# Patient Record
Sex: MALE | Race: AMERICAN INDIAN OR ALASKA NATIVE
[De-identification: names, ages, dates, MRNs, and addresses within clinical notes are randomized per-mention and may not be internally consistent; named-entity substitution may affect disease eponyms.]

---

## 2018-08-11 ENCOUNTER — HOSPITAL ENCOUNTER (INPATIENT)
Dept: HOSPITAL 31 - C.ER | Age: 23
LOS: 4 days | Discharge: HOME | DRG: 895 | End: 2018-08-15
Attending: PSYCHIATRIC UNIT | Admitting: PSYCHIATRIC UNIT
Payer: COMMERCIAL

## 2018-08-11 VITALS — BODY MASS INDEX: 25.8 KG/M2

## 2018-08-11 DIAGNOSIS — D72.829: ICD-10-CM

## 2018-08-11 DIAGNOSIS — N39.0: ICD-10-CM

## 2018-08-11 DIAGNOSIS — F12.20: ICD-10-CM

## 2018-08-11 DIAGNOSIS — F11.23: Primary | ICD-10-CM

## 2018-08-11 DIAGNOSIS — F17.210: ICD-10-CM

## 2018-08-11 DIAGNOSIS — J45.909: ICD-10-CM

## 2018-08-11 LAB
ALBUMIN SERPL-MCNC: 4.3 G/DL (ref 3.5–5)
ALBUMIN/GLOB SERPL: 1.4 {RATIO} (ref 1–2.1)
ALT SERPL-CCNC: 21 U/L (ref 21–72)
AST SERPL-CCNC: 18 U/L (ref 17–59)
BASOPHILS # BLD AUTO: 0.1 K/UL (ref 0–0.2)
BASOPHILS NFR BLD: 0.5 % (ref 0–2)
BILIRUB UR-MCNC: NEGATIVE MG/DL
BUN SERPL-MCNC: 9 MG/DL (ref 9–20)
CALCIUM SERPL-MCNC: 9.7 MG/DL (ref 8.6–10.4)
EOSINOPHIL # BLD AUTO: 0.4 K/UL (ref 0–0.7)
EOSINOPHIL NFR BLD: 2.7 % (ref 0–4)
ERYTHROCYTE [DISTWIDTH] IN BLOOD BY AUTOMATED COUNT: 13.1 % (ref 11.5–14.5)
GFR NON-AFRICAN AMERICAN: > 60
GLUCOSE UR STRIP-MCNC: NORMAL MG/DL
HGB BLD-MCNC: 13.6 G/DL (ref 12–18)
LEUKOCYTE ESTERASE UR-ACNC: (no result) LEU/UL
LYMPHOCYTES # BLD AUTO: 1.8 K/UL (ref 1–4.3)
LYMPHOCYTES NFR BLD AUTO: 13.4 % (ref 20–40)
MCH RBC QN AUTO: 28.1 PG (ref 27–31)
MCHC RBC AUTO-ENTMCNC: 33.1 G/DL (ref 33–37)
MCV RBC AUTO: 84.8 FL (ref 80–94)
MONOCYTES # BLD: 1 K/UL (ref 0–0.8)
MONOCYTES NFR BLD: 7.4 % (ref 0–10)
NEUTROPHILS # BLD: 10.4 K/UL (ref 1.8–7)
NEUTROPHILS NFR BLD AUTO: 76 % (ref 50–75)
NRBC BLD AUTO-RTO: 0 % (ref 0–2)
PH UR STRIP: 7 [PH] (ref 5–8)
PLATELET # BLD: 234 K/UL (ref 130–400)
PMV BLD AUTO: 8.7 FL (ref 7.2–11.7)
PROT UR STRIP-MCNC: NEGATIVE MG/DL
RBC # BLD AUTO: 4.83 MIL/UL (ref 4.4–5.9)
RBC # UR STRIP: NEGATIVE /UL
SP GR UR STRIP: 1.02 (ref 1–1.03)
URINE AMORPHOUS SEDIMENT: (no result) /UL
UROBILINOGEN UR-MCNC: 2 MG/DL (ref 0.2–1)
WBC # BLD AUTO: 13.6 K/UL (ref 4.8–10.8)

## 2018-08-11 PROCEDURE — HZ2ZZZZ DETOXIFICATION SERVICES FOR SUBSTANCE ABUSE TREATMENT: ICD-10-PCS | Performed by: PSYCHIATRIC UNIT

## 2018-08-11 PROCEDURE — HZ59ZZZ INDIVIDUAL PSYCHOTHERAPY FOR SUBSTANCE ABUSE TREATMENT, SUPPORTIVE: ICD-10-PCS | Performed by: PSYCHIATRIC UNIT

## 2018-08-11 PROCEDURE — HZ56ZZZ INDIVIDUAL PSYCHOTHERAPY FOR SUBSTANCE ABUSE TREATMENT, PSYCHOEDUCATION: ICD-10-PCS | Performed by: PSYCHIATRIC UNIT

## 2018-08-11 RX ADMIN — BUPRENORPHINE HCL SCH MG: 2 TABLET SUBLINGUAL at 19:03

## 2018-08-11 RX ADMIN — BUPRENORPHINE HCL SCH MG: 2 TABLET SUBLINGUAL at 20:08

## 2018-08-11 RX ADMIN — BUPRENORPHINE HCL SCH MG: 2 TABLET SUBLINGUAL at 20:58

## 2018-08-11 NOTE — C.PDOC
History Of Present Illness


22 year old male patient presents to the ER requesting detox for heroin. 

Patient states he has been inhaling heroin for x1 year. His last dose was 

yesterday afternoon. Patient notes this is his first detox for heroin. Patient 

denies fever, chills, nausea, vomiting, abdominal pain, and diarrhea, suicidal 

or homicidal ideation. 


Time Seen by Provider: 08/11/18 14:03


Chief Complaint (Nursing): Substance Abuse


History Per: Patient


History/Exam Limitations: no limitations





Past Medical History


Reviewed: Historical Data, Nursing Documentation, Vital Signs


Vital Signs: 


 Last Vital Signs











Temp  98.6 F   08/11/18 16:51


 


Pulse  86   08/11/18 16:51


 


Resp  18   08/11/18 16:51


 


BP  128/78   08/11/18 16:51


 


Pulse Ox  100   08/11/18 18:03














- Medical History


PMH: Asthma


Family History: States: Unknown Family Hx





- Social History


Hx Alcohol Use: No


Hx Substance Use: Yes (heroin)





- Immunization History


Hx Tetanus Toxoid Vaccination: No


Hx Influenza Vaccination: Yes


Hx Pneumococcal Vaccination: No





Review Of Systems


Except As Marked, All Systems Reviewed And Found Negative.


Constitutional: Positive for: Other (requesting detox).  Negative for: Fever, 

Chills


Gastrointestinal: Negative for: Nausea, Vomiting, Abdominal Pain, Diarrhea





Physical Exam





- Physical Exam


Appears: Well, Non-toxic, No Acute Distress


Skin: Normal Color, Warm, Dry


Head: Atraumatic, Normacephalic


Eye(s): bilateral: Normal Inspection


Ear(s): Bilateral: Normal


Nose: Normal


Oral Mucosa: Moist


Throat: Normal


Neck: Normal ROM, Supple


Chest: Symmetrical, No Deformity


Cardiovascular: Rhythm Regular


Respiratory: Normal Breath Sounds, No Rales, No Rhonchi, No Wheezing


Gastrointestinal/Abdominal: Soft, No Tenderness


Back: No CVA Tenderness


Extremity: Normal ROM (x4)


Neurological/Psych: Oriented x3, Normal Speech, Normal Motor, Normal Sensation, 

Normal Reflexes


Gait: Steady





ED Course And Treatment





- Laboratory Results


Result Diagrams: 


 08/11/18 14:13





 08/11/18 14:13


Lab Interpretation: Abnormal (leukocytosis, trace of leuk est and elevated wbc 

of urine)


O2 Sat by Pulse Oximetry: 100 (RA)


Pulse Ox Interpretation: Normal





Medical Decision Making


Medical Decision Making: 


Impression:  requesting detox for heroin


Plans: 


-- blood work 


-- keflex


-- urine cx


-- UA


Reassess: Patient is resting comfortably. Tolerating PO. Patient will receive a 

detox bed for his detox for heroin. Pt was seen by Crisis worker and was 

accepted by Dr. Salter. 





RECOMMEND KEFLEX FOR URINARY TRACK INFECTION AND REPEAT CBC. PLEASE ORDER 

HOSPITALIST EVALUATION ON THE FLOOR. 





Disposition


Counseled Patient/Family Regarding: Studies Performed, Diagnosis





- Disposition


Disposition: HOSPITALIZED


Disposition Time: 16:25


Condition: STABLE





- Clinical Impression


Clinical Impression: 


 Opioid dependence, Urinary tract infection, Leukocytosis








- PA / NP / Resident Statement


MD/ has reviewed & agrees with the documentation as recorded.





- Scribe Statement


The provider has reviewed the documentation as recorded by the Ritchie Colón Do





All medical record entries made by the Scribe were at my direction and 

personally dictated by me. I have reviewed the chart and agree that the record 

accurately reflects my personal performance of the history, physical exam, 

medical decision making, and the department course for this patient. I have 

also personally directed, reviewed, and agree with the discharge instructions 

and disposition.





Decision To Admit





- Pt Status Changed To:


Hospital Disposition Of: Inpatient





- Admit Certification


Admit to Inpatient:: After my assessment, the patient will require 

hospitalization for at least two midnights.  This is because of the severity of 

symptoms shown, intensity of services needed, and/or the medical risk in this 

patient being treated as an outpatient.





- InPatient:


Physician Admission Certification: I certify that this patient requires 2 or 

more midnights of care for the following reason:: Pt admitted for opiod detox. 

will treat uti with keflex and repeat cbc.





- .


Bed Request Type: Detox


Admitting Physician: Rohan Salter


Patient Diagnosis: 


 Opioid dependence, Urinary tract infection, Leukocytosis

## 2018-08-11 NOTE — PCM.BM
<Gilberto Geiger - Last Filed: 08/11/18 16:44>





Treatment Plan Problems





- Problems identified on initial assessmt


  ** potential for opiate withdrawal


Date Initiated: 08/11/18


Time Initiated: 16:45


Status: Active





Treatment assets and liabiliti


Patient Assests: cognitively intact


Patient Liabilities: substance abuse





- Milieu Protocol


Maintain good personal hygiene: daily Encourage regular showers, daily Remind 

patient to perform daily oral care, daily Assist patient to perform ADL's


Maintain personal safety: every shift Educate patient to report safety concerns 

to staff, every shift Monitor environment for contraband/sharps


Medication safety: Monitor for expected outcome, potential side effects: every 

shift, Assess barriers to learning: every shift, Assess readiness for 

medication education: every shift





<Aydee Garcia - Last Filed: 08/15/18 08:58>





- Diagnosis


(1) Opioid dependence


Status: Acute   


Interventions: 





08/13/18 18:58


* Assess 7x/week regarding severity of withdrawal


* Educate regarding risks, benefits, side effects and alternatives of 

medications


* Use Motivational Interviewing for abstinence


* Use CBT for relapse prevention


* Medication management for withdrawal symptoms


* Encourage medication assisted treatment


*

## 2018-08-12 RX ADMIN — HYDROCORTISONE SCH: 25 CREAM TOPICAL at 19:41

## 2018-08-12 RX ADMIN — HYDROCORTISONE SCH APPLIC: 25 CREAM TOPICAL at 18:28

## 2018-08-12 NOTE — PCM.PSYCH
Initial Psychiatric Evaluation





- Initial Psychiatric Evaluation


Type of Admission: Voluntary


Legal Status: Capacity


Chief Complaint (in patient's own words): 





I need help from his substance use.


I want to stop using drugs.


History of Present Illness and Precipitating Events: 





Patient is a 22 years old, single, employed,  male with no previous 

psychiatric history was admitted due to withdrawing from heroin and cannabis.





Heroin: Patient started using heroin one year ago. Prior to that he was taking 

Percocet. Initially he was prescribed Percocet 30 mg daily. He increased taking 

Percocet. He was taking way more than prescribed for about 2 years and after 

that he switched to heroin. Currently he was using almost 1 bundle daily. He 

was dividing 1 bundle and was taking 3 times daily, snorting or IV. Last used 

reported 2 days ago. Denied any previous detox or rehabs.





Cannabis: He started using cannabis at 17 years of age, was using once a month. 

His last used was 5 days ago. HI and he was using 1 g.





He smokes half pack of cigarettes daily and is requesting for nicotine patch.





Patient was born in [place] and has high school graduation. He is working. 

He is single and has 111 months old daughter would lives with her mother. 

Patient lives with his father. His height is 5 feet 10 inches and weight is 180 

pounds.





Patient does not want to go to any program for follow-up care after discharge 

from the hospital.


Current Medications: 





Active Medications











Generic Name Dose Route Start Last Admin





  Trade Name Freq  PRN Reason Stop Dose Admin


 


Al Hydrox/Mg Hydrox/Simethicone  30 ml  08/11/18 17:35  





  Maalox 30 Ml  PO   





  TID PRN   





  Indigestion / Heartburn   


 


Buprenorphine HCl  4 mg  08/13/18 10:00  





  Subutex  SL  08/16/18 09:59  





  .TAPER LEONARD   





  Taper   


 


Clonidine HCl  0.1 mg  08/11/18 17:32  08/11/18 21:18





  Catapres  PO   0.1 mg





  Q8 PRN   Administration





  COWS Score More or Equal to 5   


 


Dicyclomine HCl  10 mg  08/11/18 17:36  08/11/18 21:17





  Bentyl  PO   10 mg





  Q6 PRN   Administration





  Abdominal Cramps   


 


Hydrocortisone  0 gm  08/12/18 18:00  





  Anusol-Hc  AR   





  BID LEONARD   


 


Ibuprofen  400 mg  08/11/18 17:36  08/12/18 11:55





  Motrin Tab  PO   400 mg





  Q6 PRN   Administration





  Pain, moderate (4-7)   


 


Loperamide HCl  2 mg  08/11/18 17:35  





  Imodium  PO   





  Q8 PRN   





  Diarrhea   


 


Nicotine  1 patch  08/12/18 13:15  08/12/18 13:26





  Nicoderm Cq  TD   1 patch





  DAILY LEONARD   Administration


 


Ondansetron HCl  4 mg  08/11/18 17:35  08/11/18 21:18





  Zofran Tab  PO   4 mg





  Q8 PRN   Administration





  Nausea/Vomiting   


 


Trazodone HCl  50 mg  08/11/18 22:00  08/11/18 21:17





  Desyrel  PO   50 mg





  HS LEONARD   Administration














Past Psychiatric History





- Past Psychiatric History


Previous Treatment History: None


History of Abuse: 





None reported


History of ETOH/Drug Use: 





See HPI


History of Family Illness: 





None reported


Pertinent Medical Hx (Current Medical&Sleep Prob, Allergies): 





 Allergies











Allergy/AdvReac Type Severity Reaction Status Date / Time


 


No Known Allergies Allergy   Verified 08/11/18 13:56








 





RX: No Known Home Med  08/11/18 





Asthma


UTI





Review of Systems





- Psychiatric


Psychiatric: As Per HPI





Mental Status Examination





- Personal Presentation


Personal Presentation: Looks stated age





- Affect


Affect: Other (Appropriate)





- Motor Activity


Motor Activity: Calm





- Reliability in Providing Information


Reliability in Providing Information: Fair





- Speech


Speech: Organized





- Mood


Mood: Anxious





- Formal Thought Process


Formal Thought Process: No Impairment





- Hallucinations/Delusions


Hallucinations: Other (None reported)


Delusions: Other





- Obsessions/Compulsions


Obsessions: None


Compulsions: None





- Cognitive Functions


Orientation: Person, Place, Situation, Time


Sensorium: Alert


Attention/Concentration: Attentive


Abstract Thinking: [place]


Estimate of Intelligence: Average


Judgement: Intact, as evidence by: Insight regarding need for hospitalization


Memory: Recent intact, as evidence by: Ability to recall events of the day, 

Remote intact, as evidenced by: Ability to recall historical events





- Risk


Risk: Withdrawal, Diminished functioning





- Strength & Assets Inventory


Strength & Assets Inventory: Family support, Cooperative





- Limitations


Limitations: Other





DSM 5 DX





- DSM 5


DSM 5 Diagnosis: 





Opiate use disorder severe


Cannabis use disorder severe





- Recommended/Plan of Treatment


Treatment Recommendations and Plan of Treatment: 





Patient education.


Supportive therapy.


CBT for relapse prevention.


MI for abstinence.


Subutex taper for opiate withdrawal symptoms.


Other when necessary medication.


Projected ELOS: 4-5 days





- Smoking Cessation


Smoking Cessation Initiated: Yes

## 2018-08-12 NOTE — CP.PCM.CON
<Wilberto Price P - Last Filed: 08/12/18 04:29>





Meds


Allergies/Adverse Reactions: 


 Allergies











Allergy/AdvReac Type Severity Reaction Status Date / Time


 


No Known Allergies Allergy   Verified 08/11/18 13:56














- Medications


Medications: 


 Current Medications





Al Hydrox/Mg Hydrox/Simethicone (Maalox 30 Ml)  30 ml PO TID PRN


   PRN Reason: Indigestion / Heartburn


Buprenorphine HCl (Subutex)  6 mg SL .TAPER LEONARD


   PRN Reason: Taper


   Stop: 08/15/18 17:59


Clonidine HCl (Catapres)  0.1 mg PO Q8 PRN


   PRN Reason: COWS Score More or Equal to 5


   Last Admin: 08/11/18 21:18 Dose:  0.1 mg


Dicyclomine HCl (Bentyl)  10 mg PO Q6 PRN


   PRN Reason: Abdominal Cramps


   Last Admin: 08/11/18 21:17 Dose:  10 mg


Ibuprofen (Motrin Tab)  400 mg PO Q6 PRN


   PRN Reason: Pain, moderate (4-7)


   Last Admin: 08/11/18 21:18 Dose:  400 mg


Loperamide HCl (Imodium)  2 mg PO Q8 PRN


   PRN Reason: Diarrhea


Ondansetron HCl (Zofran Tab)  4 mg PO Q8 PRN


   PRN Reason: Nausea/Vomiting


   Last Admin: 08/11/18 21:18 Dose:  4 mg


Trazodone HCl (Desyrel)  50 mg PO HS [facility]


   Last Admin: 08/11/18 21:17 Dose:  50 mg











Results





- Vital Signs


Recent Vital Signs: 


 Last Vital Signs











Temp  98.8 F   08/11/18 21:05


 


Pulse  78   08/11/18 21:05


 


Resp  18   08/11/18 21:05


 


BP  120/67   08/11/18 21:05


 


Pulse Ox  99   08/11/18 21:05














- Labs


Result Diagrams: 


 08/11/18 14:13





 08/11/18 14:13


Labs: 


 Laboratory Results - last 24 hr











  08/11/18 08/11/18 08/11/18





  14:13 14:13 14:13


 


WBC  13.6 H  


 


RBC  4.83  


 


Hgb  13.6  D  


 


Hct  40.9  


 


MCV  84.8  D  


 


MCH  28.1  


 


MCHC  33.1  


 


RDW  13.1  


 


Plt Count  234  


 


MPV  8.7  


 


Neut % (Auto)  76.0 H  


 


Lymph % (Auto)  13.4 L  


 


Mono % (Auto)  7.4  


 


Eos % (Auto)  2.7  


 


Baso % (Auto)  0.5  


 


Neut # (Auto)  10.4 H  


 


Lymph # (Auto)  1.8  


 


Mono # (Auto)  1.0 H  


 


Eos # (Auto)  0.4  


 


Baso # (Auto)  0.1  


 


Sodium    141


 


Potassium    3.8


 


Chloride    99


 


Carbon Dioxide    31 H


 


Anion Gap    15


 


BUN    9


 


Creatinine    0.8


 


Est GFR ( Amer)    > 60


 


Est GFR (Non-Af Amer)    > 60


 


Random Glucose    133 H


 


Calcium    9.7


 


Total Bilirubin    0.5


 


AST    18


 


ALT    21


 


Alkaline Phosphatase    72


 


Total Protein    7.3


 


Albumin    4.3


 


Globulin    3.0


 


Albumin/Globulin Ratio    1.4


 


Urine Color   Yellow 


 


Urine Clarity   Hazy 


 


Urine pH   7.0 


 


Ur Specific Gravity   1.021 


 


Urine Protein   Negative 


 


Urine Glucose (UA)   Normal 


 


Urine Ketones   Negative 


 


Urine Blood   Negative 


 


Urine Nitrate   Negative 


 


Urine Bilirubin   Negative 


 


Urine Urobilinogen   2.0 


 


Ur Leukocyte Esterase   Trace 


 


Urine WBC (Auto)   8 H 


 


Urine RBC (Auto)   3 


 


Amorphous Sediment   Few H 


 


Urine Opiates Screen   


 


Urine Methadone Screen   


 


Ur Barbiturates Screen   


 


Ur Phencyclidine Scrn   


 


Ur Amphetamines Screen   


 


U Benzodiazepines Scrn   


 


U Oth Cocaine Metabols   


 


U Cannabinoids Screen   


 


Alcohol, Quantitative    < 10














  08/11/18





  14:13


 


WBC 


 


RBC 


 


Hgb 


 


Hct 


 


MCV 


 


MCH 


 


MCHC 


 


RDW 


 


Plt Count 


 


MPV 


 


Neut % (Auto) 


 


Lymph % (Auto) 


 


Mono % (Auto) 


 


Eos % (Auto) 


 


Baso % (Auto) 


 


Neut # (Auto) 


 


Lymph # (Auto) 


 


Mono # (Auto) 


 


Eos # (Auto) 


 


Baso # (Auto) 


 


Sodium 


 


Potassium 


 


Chloride 


 


Carbon Dioxide 


 


Anion Gap 


 


BUN 


 


Creatinine 


 


Est GFR ( Amer) 


 


Est GFR (Non-Af Amer) 


 


Random Glucose 


 


Calcium 


 


Total Bilirubin 


 


AST 


 


ALT 


 


Alkaline Phosphatase 


 


Total Protein 


 


Albumin 


 


Globulin 


 


Albumin/Globulin Ratio 


 


Urine Color 


 


Urine Clarity 


 


Urine pH 


 


Ur Specific Gravity 


 


Urine Protein 


 


Urine Glucose (UA) 


 


Urine Ketones 


 


Urine Blood 


 


Urine Nitrate 


 


Urine Bilirubin 


 


Urine Urobilinogen 


 


Ur Leukocyte Esterase 


 


Urine WBC (Auto) 


 


Urine RBC (Auto) 


 


Amorphous Sediment 


 


Urine Opiates Screen  Positive H


 


Urine Methadone Screen  Negative


 


Ur Barbiturates Screen  Negative


 


Ur Phencyclidine Scrn  Negative


 


Ur Amphetamines Screen  Negative


 


U Benzodiazepines Scrn  Negative


 


U Oth Cocaine Metabols  Negative


 


U Cannabinoids Screen  Positive H


 


Alcohol, Quantitative 














Attending/Attestation





- Attestation


I have personally seen and examined this patient.: Yes


I have fully participated in the care of the patient.: Yes


I have reviewed all pertinent clinical information: Yes


Notes (Text): 





Assessment


A/w heroin withdrawal for detox


Medicine consulted for leucocytosis, mainly PMN, and wbc in urine, patient doesn

't have any clinical sighs and symptoms of active infection, urine picture is 

nonspecific, leucocytosis is likely form stress of heroin withdrawal.





Plan


No abx recommended


Detox as per unit.


Counselled about substance abuse.





<Winston Ugarte - Last Filed: 08/12/18 05:50>





History of Present Illness





- History of Present Illness


History of Present Illness: 





21 yo male with PMh of substance abuse, heroin dependance in detox unit with a 

one week history of dark urine. Pt says he has been hydrating with only sodas 

and juice not water. Pt had a UA that showed trace WBC and had a slightly 

elavated WBC on CBC 14. Pt is asymptomatic





Denies burning, itching, back pain, fevers chills, n/v, CP SOB





Review of Systems





- Constitutional


Constitutional: As Per HPI





- EENT


Eyes: As Per HPI


Ears: As Per HPI


Nose/Mouth/Throat: As Per HPI





- Cardiovascular


Cardiovascular: As Per HPI





- Respiratory


Respiratory: As Per HPI





- Gastrointestinal


Gastrointestinal: As Per HPI





- Genitourinary


Genitourinary: As Per HPI





- Reproductive: Male


Reproductive:Male: As Per HPI





- Musculoskeletal


Musculoskeletal: As Per HPI





- Integumentary


Integumentary: As Per HPI





Past Patient History





- Infectious Disease


Hx of Infectious Diseases: None





- Past Medical History & Family History


Past Medical History?: Yes





- Past Social History


Smoking Status: Light Smoker < 10 Cigarettes Daily





- CARDIAC


Hx Cardiac Disorders: No


Hx Hypertension: No





- PULMONARY


Hx Asthma: Yes





- NEUROLOGICAL


HX Cerebrovascular Accident: No


Hx Seizures: No





- HEMATOLOGICAL/ONCOLOGICAL


Hx Cancer: No


Hx Human Immunodeficiency Virus (HIV): No





- MUSCULOSKELETAL/RHEUMATOLOGICAL


Hx Falls: No





- GENITOURINARY/GYNECOLOGICAL


Hx Sexually Transmitted Disorders: No





- PSYCHIATRIC


Hx Substance Use: Yes





- SURGICAL HISTORY


Hx Surgeries: No





- ANESTHESIA


Hx Anesthesia: No





Meds





- Medications


Medications: 


 Current Medications





Al Hydrox/Mg Hydrox/Simethicone (Maalox 30 Ml)  30 ml PO TID PRN


   PRN Reason: Indigestion / Heartburn


Buprenorphine HCl (Subutex)  6 mg SL .TAPER LEONARD


   PRN Reason: Taper


   Stop: 08/15/18 17:59


Clonidine HCl (Catapres)  0.1 mg PO Q8 PRN


   PRN Reason: COWS Score More or Equal to 5


   Last Admin: 08/11/18 21:18 Dose:  0.1 mg


Dicyclomine HCl (Bentyl)  10 mg PO Q6 PRN


   PRN Reason: Abdominal Cramps


   Last Admin: 08/11/18 21:17 Dose:  10 mg


Ibuprofen (Motrin Tab)  400 mg PO Q6 PRN


   PRN Reason: Pain, moderate (4-7)


   Last Admin: 08/11/18 21:18 Dose:  400 mg


Loperamide HCl (Imodium)  2 mg PO Q8 PRN


   PRN Reason: Diarrhea


Ondansetron HCl (Zofran Tab)  4 mg PO Q8 PRN


   PRN Reason: Nausea/Vomiting


   Last Admin: 08/11/18 21:18 Dose:  4 mg


Trazodone HCl (Desyrel)  50 mg PO HS [facility]


   Last Admin: 08/11/18 21:17 Dose:  50 mg











Physical Exam





- Constitutional


Appears: Well, Non-toxic, No Acute Distress





- Head Exam


Head Exam: ATRAUMATIC, NORMAL INSPECTION





- Eye Exam


Eye Exam: EOMI, Normal appearance





- ENT Exam


ENT Exam: Mucous Membranes Moist, Normal Exam





- Neck Exam


Neck exam: Positive for: Normal Inspection





- Respiratory Exam


Respiratory Exam: Clear to Auscultation Bilateral, NORMAL BREATHING PATTERN





- Cardiovascular Exam


Cardiovascular Exam: RRR, +S1, +S2.  absent: Systolic Murmur





- GI/Abdominal Exam


GI & Abdominal Exam: Soft.  absent: Tenderness





- Back Exam


Back exam: NORMAL INSPECTION.  absent: tenderness





- Neurological Exam


Neurological exam: Alert, CN II-XII Intact, Normal Gait, Oriented x3





- Psychiatric Exam


Psychiatric exam: Normal Affect, Normal Mood





- Skin


Skin Exam: Dry, Normal Color, Warm





Results





- Vital Signs


Recent Vital Signs: 


 Last Vital Signs











Temp  98.8 F   08/11/18 21:05


 


Pulse  78   08/11/18 21:05


 


Resp  18   08/11/18 21:05


 


BP  120/67   08/11/18 21:05


 


Pulse Ox  99   08/11/18 21:05














- Labs


Result Diagrams: 


 08/11/18 14:13





 08/11/18 14:13


Labs: 


 Laboratory Results - last 24 hr











  08/11/18 08/11/18 08/11/18





  14:13 14:13 14:13


 


WBC  13.6 H  


 


RBC  4.83  


 


Hgb  13.6  D  


 


Hct  40.9  


 


MCV  84.8  D  


 


MCH  28.1  


 


MCHC  33.1  


 


RDW  13.1  


 


Plt Count  234  


 


MPV  8.7  


 


Neut % (Auto)  76.0 H  


 


Lymph % (Auto)  13.4 L  


 


Mono % (Auto)  7.4  


 


Eos % (Auto)  2.7  


 


Baso % (Auto)  0.5  


 


Neut # (Auto)  10.4 H  


 


Lymph # (Auto)  1.8  


 


Mono # (Auto)  1.0 H  


 


Eos # (Auto)  0.4  


 


Baso # (Auto)  0.1  


 


Sodium    141


 


Potassium    3.8


 


Chloride    99


 


Carbon Dioxide    31 H


 


Anion Gap    15


 


BUN    9


 


Creatinine    0.8


 


Est GFR ( Amer)    > 60


 


Est GFR (Non-Af Amer)    > 60


 


Random Glucose    133 H


 


Calcium    9.7


 


Total Bilirubin    0.5


 


AST    18


 


ALT    21


 


Alkaline Phosphatase    72


 


Total Protein    7.3


 


Albumin    4.3


 


Globulin    3.0


 


Albumin/Globulin Ratio    1.4


 


Urine Color   Yellow 


 


Urine Clarity   Hazy 


 


Urine pH   7.0 


 


Ur Specific Gravity   1.021 


 


Urine Protein   Negative 


 


Urine Glucose (UA)   Normal 


 


Urine Ketones   Negative 


 


Urine Blood   Negative 


 


Urine Nitrate   Negative 


 


Urine Bilirubin   Negative 


 


Urine Urobilinogen   2.0 


 


Ur Leukocyte Esterase   Trace 


 


Urine WBC (Auto)   8 H 


 


Urine RBC (Auto)   3 


 


Amorphous Sediment   Few H 


 


Urine Opiates Screen   


 


Urine Methadone Screen   


 


Ur Barbiturates Screen   


 


Ur Phencyclidine Scrn   


 


Ur Amphetamines Screen   


 


U Benzodiazepines Scrn   


 


U Oth Cocaine Metabols   


 


U Cannabinoids Screen   


 


Alcohol, Quantitative    < 10














  08/11/18





  14:13


 


WBC 


 


RBC 


 


Hgb 


 


Hct 


 


MCV 


 


MCH 


 


MCHC 


 


RDW 


 


Plt Count 


 


MPV 


 


Neut % (Auto) 


 


Lymph % (Auto) 


 


Mono % (Auto) 


 


Eos % (Auto) 


 


Baso % (Auto) 


 


Neut # (Auto) 


 


Lymph # (Auto) 


 


Mono # (Auto) 


 


Eos # (Auto) 


 


Baso # (Auto) 


 


Sodium 


 


Potassium 


 


Chloride 


 


Carbon Dioxide 


 


Anion Gap 


 


BUN 


 


Creatinine 


 


Est GFR ( Amer) 


 


Est GFR (Non-Af Amer) 


 


Random Glucose 


 


Calcium 


 


Total Bilirubin 


 


AST 


 


ALT 


 


Alkaline Phosphatase 


 


Total Protein 


 


Albumin 


 


Globulin 


 


Albumin/Globulin Ratio 


 


Urine Color 


 


Urine Clarity 


 


Urine pH 


 


Ur Specific Gravity 


 


Urine Protein 


 


Urine Glucose (UA) 


 


Urine Ketones 


 


Urine Blood 


 


Urine Nitrate 


 


Urine Bilirubin 


 


Urine Urobilinogen 


 


Ur Leukocyte Esterase 


 


Urine WBC (Auto) 


 


Urine RBC (Auto) 


 


Amorphous Sediment 


 


Urine Opiates Screen  Positive H


 


Urine Methadone Screen  Negative


 


Ur Barbiturates Screen  Negative


 


Ur Phencyclidine Scrn  Negative


 


Ur Amphetamines Screen  Negative


 


U Benzodiazepines Scrn  Negative


 


U Oth Cocaine Metabols  Negative


 


U Cannabinoids Screen  Positive H


 


Alcohol, Quantitative 














Assessment & Plan





- Assessment and Plan (Free Text)


Assessment: 





Leukocytosis:


-asymptomatic, afebrile


-no medical intervention need at this time





substance abuse:


-pt counseled


-lifestyle modification recommeded

## 2018-08-13 VITALS — RESPIRATION RATE: 18 BRPM

## 2018-08-13 RX ADMIN — BUPRENORPHINE HCL SCH MG: 2 TABLET SUBLINGUAL at 09:51

## 2018-08-13 RX ADMIN — ALUMINUM HYDROXIDE AND MAGNESIUM HYDROXIDE PRN ML: 200; 200 SUSPENSION ORAL at 08:39

## 2018-08-13 RX ADMIN — ALUMINUM HYDROXIDE AND MAGNESIUM HYDROXIDE PRN ML: 200; 200 SUSPENSION ORAL at 18:12

## 2018-08-13 NOTE — PCM.PYCHPN
Psychiatric Progress Note





- Psychiatric Progress Note


Patient seen today, length of contact: 15 min


Patient Chief Complaint: 





"better"


Problems Identified/Issues Discussed: 





The pt is seen, chart reviewed, case discussed with staff.


The pt is compliant with medications and reports no side-effects.


Symptoms are improving but needs more time to stabilize. 


Pt attends groups and activities.


Support given, psycho-education provided. 


After care discussed.


Medication Change: Yes (detox changes daily)


Medical Record Reviewed: Yes





Mental Status Examination





- Cognitive Function


Orientation: Person, Place, Situation, Time


Memory: Intact


Attention: WNL


Concentration: Poor


Association: WNL


Fund of Knowledge: WNL





- Mood


Mood: Anxious





- Affect


Affect: Constricted





- Speech


Speech: Appropriate





- Formal Thought Process


Formal Thought Process: No Impairment





- Suicidal Ideation


Suicidal Ideation: No





- Homicidal Ideation


Homicidal Ideation: No





Goal/Treatment Plan





- Goal/Treatment Plan


Need for Continued Stay: Discharge may exacerbated symptoms, Severe functional 

impairment


Progress Toward Problem(s) and Goals/Treatment Plan: 





Continue medications


Support and psychoeducation daily


Attend groups and activities daily


After care planning by LUCIAN





Estimated Date of D/C: 08/15/18

## 2018-08-14 RX ADMIN — ALUMINUM HYDROXIDE AND MAGNESIUM HYDROXIDE PRN ML: 200; 200 SUSPENSION ORAL at 11:26

## 2018-08-14 RX ADMIN — BUPRENORPHINE HCL SCH MG: 2 TABLET SUBLINGUAL at 09:23

## 2018-08-15 VITALS
TEMPERATURE: 97.6 F | SYSTOLIC BLOOD PRESSURE: 127 MMHG | HEART RATE: 69 BPM | OXYGEN SATURATION: 100 % | DIASTOLIC BLOOD PRESSURE: 73 MMHG

## 2018-08-15 RX ADMIN — BUPRENORPHINE HCL SCH: 2 TABLET SUBLINGUAL at 10:08

## 2018-08-15 NOTE — PCM.PYCHPN
Psychiatric Progress Note





- Psychiatric Progress Note


Patient seen today, length of contact: 15 min


Patient Chief Complaint: 





"Sleep is not good"


Problems Identified/Issues Discussed: 





The pt is seen, chart reviewed, case discussed with staff.


Support and psychoeducation given, CBT and MI used briefly


No new symptoms reported, improving slowly and needs more time


No SEs from medications, risks discussed.


After care discussed


Medication Change: Yes (detox changes daily)


Medical Record Reviewed: Yes





Mental Status Examination





- Cognitive Function


Orientation: Person, Place, Situation, Time


Memory: Intact


Attention: WNL


Concentration: Poor


Association: WNL


Fund of Knowledge: WNL





- Mood


Mood: Anxious





- Affect


Affect: Constricted





- Speech


Speech: Appropriate





- Formal Thought Process


Formal Thought Process: No Impairment





- Suicidal Ideation


Suicidal Ideation: No





- Homicidal Ideation


Homicidal Ideation: No





Goal/Treatment Plan





- Goal/Treatment Plan


Need for Continued Stay: Discharge may exacerbated symptoms, Severe functional 

impairment


Progress Toward Problem(s) and Goals/Treatment Plan: 





Continue medications


Support and psychoeducation daily


Attend groups and activities daily


After care planning by LUCIAN





Estimated Date of D/C: 08/15/18

## 2018-08-15 NOTE — PCM.PYCHDC
Mental Status Examination





- Mental Status Examination


Orientation: Person





Discharge Summary





- Discharge Note


Consultations:: List each consultation separately and include:  1. Reason for 

request.  2. Findings.  3. Follow-up


Summary of Hospital Course include:: 1. Description of specific treatment plan 

utilized for patients during their course of treatmen.  2. Summarize the time-

course for resolution of acute symptoms and/or regressed behaviors.  3. 

Describe issues identified and worked on during hospitalization.  4. Describe 

medication utilized.  5. Describe medical problems identified and treated.  6. 

Reassessment of suicide risk


Summary of Hospital Course: 











He will go to [facility]





- Final Diagnosis (DSM 5)


Condition upon Discharge: STABLE


Disposition: HOME/ ROUTINE


Prescriptions/Medication Reconciliation: 


QUEtiapine [SEROquel] 50 mg PO HS #30 tab


traZODone [Desyrel] 50 mg PO HS #30 tab